# Patient Record
Sex: FEMALE | Race: WHITE | Employment: UNEMPLOYED | URBAN - METROPOLITAN AREA
[De-identification: names, ages, dates, MRNs, and addresses within clinical notes are randomized per-mention and may not be internally consistent; named-entity substitution may affect disease eponyms.]

---

## 2017-03-23 ENCOUNTER — LAB ENCOUNTER (OUTPATIENT)
Dept: LAB | Age: 37
End: 2017-03-23
Attending: FAMILY MEDICINE
Payer: COMMERCIAL

## 2017-03-23 DIAGNOSIS — Z13.29 THYROID DISORDER SCREEN: ICD-10-CM

## 2017-03-23 DIAGNOSIS — Z13.220 SCREENING CHOLESTEROL LEVEL: ICD-10-CM

## 2017-03-23 DIAGNOSIS — Z13.1 DIABETES MELLITUS SCREENING: ICD-10-CM

## 2017-03-23 LAB
ALBUMIN SERPL-MCNC: 3.8 G/DL (ref 3.5–4.8)
ALP LIVER SERPL-CCNC: 48 U/L (ref 37–98)
ALT SERPL-CCNC: 15 U/L (ref 14–54)
ANTI-THYROGLOBULIN: 148 U/ML (ref ?–60)
ANTI-THYROPEROXIDASE: <28 U/ML (ref ?–60)
AST SERPL-CCNC: 10 U/L (ref 15–41)
BASOPHILS # BLD AUTO: 0.04 X10(3) UL (ref 0–0.1)
BASOPHILS NFR BLD AUTO: 0.7 %
BILIRUB SERPL-MCNC: 0.5 MG/DL (ref 0.1–2)
BUN BLD-MCNC: 9 MG/DL (ref 8–20)
CALCIUM BLD-MCNC: 8.7 MG/DL (ref 8.3–10.3)
CHLORIDE: 104 MMOL/L (ref 101–111)
CHOLEST SMN-MCNC: 160 MG/DL (ref ?–200)
CO2: 30 MMOL/L (ref 22–32)
CREAT BLD-MCNC: 0.95 MG/DL (ref 0.55–1.02)
EOSINOPHIL # BLD AUTO: 0.22 X10(3) UL (ref 0–0.3)
EOSINOPHIL NFR BLD AUTO: 3.9 %
ERYTHROCYTE [DISTWIDTH] IN BLOOD BY AUTOMATED COUNT: 12.2 % (ref 11.5–16)
FREE T4: 0.9 NG/DL (ref 0.9–1.8)
GLUCOSE BLD-MCNC: 76 MG/DL (ref 70–99)
HCT VFR BLD AUTO: 43 % (ref 34–50)
HDLC SERPL-MCNC: 52 MG/DL (ref 45–?)
HDLC SERPL: 3.08 {RATIO} (ref ?–4.44)
HGB BLD-MCNC: 14.3 G/DL (ref 12–16)
IMMATURE GRANULOCYTE COUNT: 0.01 X10(3) UL (ref 0–1)
IMMATURE GRANULOCYTE RATIO %: 0.2 %
LDLC SERPL CALC-MCNC: 86 MG/DL (ref ?–130)
LYMPHOCYTES # BLD AUTO: 1.77 X10(3) UL (ref 0.9–4)
LYMPHOCYTES NFR BLD AUTO: 31.3 %
M PROTEIN MFR SERPL ELPH: 7.2 G/DL (ref 6.1–8.3)
MCH RBC QN AUTO: 30.8 PG (ref 27–33.2)
MCHC RBC AUTO-ENTMCNC: 33.3 G/DL (ref 31–37)
MCV RBC AUTO: 92.5 FL (ref 81–100)
MONOCYTES # BLD AUTO: 0.49 X10(3) UL (ref 0.1–0.6)
MONOCYTES NFR BLD AUTO: 8.7 %
NEUTROPHIL ABS PRELIM: 3.13 X10 (3) UL (ref 1.3–6.7)
NEUTROPHILS # BLD AUTO: 3.13 X10(3) UL (ref 1.3–6.7)
NEUTROPHILS NFR BLD AUTO: 55.2 %
NONHDLC SERPL-MCNC: 108 MG/DL (ref ?–130)
PLATELET # BLD AUTO: 238 10(3)UL (ref 150–450)
POTASSIUM SERPL-SCNC: 4 MMOL/L (ref 3.6–5.1)
RBC # BLD AUTO: 4.65 X10(6)UL (ref 3.8–5.1)
RED CELL DISTRIBUTION WIDTH-SD: 41.5 FL (ref 35.1–46.3)
SODIUM SERPL-SCNC: 140 MMOL/L (ref 136–144)
TRIGLYCERIDES: 112 MG/DL (ref ?–150)
TSI SER-ACNC: 0.46 MIU/ML (ref 0.35–5.5)
VLDL: 22 MG/DL (ref 5–40)
WBC # BLD AUTO: 5.7 X10(3) UL (ref 4–13)

## 2017-03-23 PROCEDURE — 86800 THYROGLOBULIN ANTIBODY: CPT

## 2017-03-23 PROCEDURE — 80053 COMPREHEN METABOLIC PANEL: CPT

## 2017-03-23 PROCEDURE — 85025 COMPLETE CBC W/AUTO DIFF WBC: CPT

## 2017-03-23 PROCEDURE — 84443 ASSAY THYROID STIM HORMONE: CPT

## 2017-03-23 PROCEDURE — 36415 COLL VENOUS BLD VENIPUNCTURE: CPT

## 2017-03-23 PROCEDURE — 84439 ASSAY OF FREE THYROXINE: CPT

## 2017-03-23 PROCEDURE — 80061 LIPID PANEL: CPT

## 2017-03-23 PROCEDURE — 86376 MICROSOMAL ANTIBODY EACH: CPT

## 2017-04-11 ENCOUNTER — OFFICE VISIT (OUTPATIENT)
Dept: OBGYN CLINIC | Facility: CLINIC | Age: 37
End: 2017-04-11

## 2017-04-11 VITALS
WEIGHT: 141 LBS | HEART RATE: 90 BPM | BODY MASS INDEX: 21.87 KG/M2 | HEIGHT: 67.5 IN | DIASTOLIC BLOOD PRESSURE: 62 MMHG | SYSTOLIC BLOOD PRESSURE: 108 MMHG

## 2017-04-11 DIAGNOSIS — N94.12 DEEP DYSPAREUNIA: ICD-10-CM

## 2017-04-11 DIAGNOSIS — Z30.09 ENCOUNTER FOR OTHER GENERAL COUNSELING OR ADVICE ON CONTRACEPTION: ICD-10-CM

## 2017-04-11 DIAGNOSIS — N81.4 UTERINE PROLAPSE: Primary | ICD-10-CM

## 2017-04-11 PROCEDURE — 99202 OFFICE O/P NEW SF 15 MIN: CPT | Performed by: OBSTETRICS & GYNECOLOGY

## 2017-04-11 RX ORDER — DULOXETIN HYDROCHLORIDE 30 MG/1
CAPSULE, DELAYED RELEASE ORAL 2 TIMES DAILY
Refills: 0 | COMMUNITY
Start: 2017-04-10 | End: 2018-12-07

## 2017-04-11 NOTE — PROGRESS NOTES
New gynecology visit. 31year old G 2 P 2002. Patient's last menstrual period was 03/21/2017. Joshua Cuellar Here for resumption of Depo Provera and counseling on uterine prolapse. Was on Depo Provera until fall of 2016.  Was amenorrheic on medication and no other organomegaly. No inguinal adenopathy. VULVA:  No lesions. Normal perineum and introital support. [place] VAGINA:  Healthy. Minimal rectocele. CERVIX:  Parous and posterior onto posterior vaginal wall. Pap smear not done. UTERUS:  Anteflexed and normal size.

## 2017-04-14 ENCOUNTER — TELEPHONE (OUTPATIENT)
Dept: OBGYN CLINIC | Facility: CLINIC | Age: 37
End: 2017-04-14

## 2017-04-14 NOTE — TELEPHONE ENCOUNTER
Left message for pt to call back for surgery scheduling.  6/27/17 and 7/10/17 both 7:30am available.

## 2017-04-19 ENCOUNTER — OFFICE VISIT (OUTPATIENT)
Dept: OBGYN CLINIC | Facility: CLINIC | Age: 37
End: 2017-04-19

## 2017-04-19 DIAGNOSIS — Z30.013 INITIATION OF DEPO PROVERA: Primary | ICD-10-CM

## 2017-04-19 PROCEDURE — 96372 THER/PROPH/DIAG INJ SC/IM: CPT | Performed by: OBSTETRICS & GYNECOLOGY

## 2017-04-19 PROCEDURE — 81025 URINE PREGNANCY TEST: CPT | Performed by: OBSTETRICS & GYNECOLOGY

## 2017-04-19 RX ORDER — MEDROXYPROGESTERONE ACETATE 150 MG/ML
150 INJECTION, SUSPENSION INTRAMUSCULAR ONCE
Status: COMPLETED | OUTPATIENT
Start: 2017-04-19 | End: 2017-04-19

## 2017-04-19 RX ADMIN — MEDROXYPROGESTERONE ACETATE 150 MG: 150 INJECTION, SUSPENSION INTRAMUSCULAR at 15:17:00

## 2017-04-19 NOTE — PROGRESS NOTES
Here for restart of Depo Provera for contraception until hysterectomy done in June 2017. On menses now.     LMP 03/21/2017

## 2017-05-09 ENCOUNTER — TELEPHONE (OUTPATIENT)
Dept: OBGYN CLINIC | Facility: CLINIC | Age: 37
End: 2017-05-09

## 2017-05-09 DIAGNOSIS — Z01.818 PRE-OP TESTING: Primary | ICD-10-CM

## 2017-05-09 NOTE — TELEPHONE ENCOUNTER
Rahel with THE [facility] stating Request for perop testing was denied by pcp  pcp stating she is not a pt of theirs and will not be ordering preop testing  Rahel requesting orders for cbc, etc from Dr. Cassandra Castellanos office

## 2017-05-09 NOTE — TELEPHONE ENCOUNTER
Per PAT, pt needs CBC before surgery on 6/27/17. Lab ordered. Left message on answering machine to call back.

## 2017-05-10 NOTE — TELEPHONE ENCOUNTER
Patient informed that she needs CBC done before 06/27/17. Verbalized understanding. No further questions or concerns.

## 2017-05-18 NOTE — TELEPHONE ENCOUNTER
Pt has surgery scheduled for 6/27/17. PSRs please call patient to schedule post op appt for 2 weeks after surgery date.

## 2017-05-18 NOTE — TELEPHONE ENCOUNTER
Pt is scheduled July 12th in [place] for Dr. Mika Llamas    Pt is asking if can get surgery sooner or be put on a waitlist

## 2017-05-18 NOTE — TELEPHONE ENCOUNTER
Informed patient that there is nothing sooner. If we have a cancellation we can let her know. Verbalized understanding. No further questions or concerns.

## 2017-05-27 ENCOUNTER — LAB ENCOUNTER (OUTPATIENT)
Dept: LAB | Age: 37
End: 2017-05-27
Attending: OBSTETRICS & GYNECOLOGY
Payer: COMMERCIAL

## 2017-05-27 DIAGNOSIS — Z01.818 PRE-OP TESTING: ICD-10-CM

## 2017-05-27 PROCEDURE — 36415 COLL VENOUS BLD VENIPUNCTURE: CPT

## 2017-05-27 PROCEDURE — 85025 COMPLETE CBC W/AUTO DIFF WBC: CPT

## 2017-06-21 ENCOUNTER — TELEPHONE (OUTPATIENT)
Dept: OBGYN CLINIC | Facility: CLINIC | Age: 37
End: 2017-06-21

## 2017-06-21 NOTE — TELEPHONE ENCOUNTER
Patient had questions regarding surgery. All questions answered. Verbalized understanding. No further questions or concerns.

## 2017-06-21 NOTE — TELEPHONE ENCOUNTER
Patient called, she scheduled for Hysterectomy on June 27th with Dr. Mika Llamas, she wants to know what will happen after and what to expect after her procedure. She wants to prepare herself for it.  Please call pt back

## 2017-06-26 ENCOUNTER — TELEPHONE (OUTPATIENT)
Dept: OBGYN CLINIC | Facility: CLINIC | Age: 37
End: 2017-06-26

## 2017-06-26 NOTE — TELEPHONE ENCOUNTER
Has to cancel surgery for tomorrow. No one to watch children due to family dispute. Case canceled with OR staff. Pt will call back to reschedule when she can.

## 2017-07-17 ENCOUNTER — OFFICE VISIT (OUTPATIENT)
Dept: OBGYN CLINIC | Facility: CLINIC | Age: 37
End: 2017-07-17

## 2017-07-17 DIAGNOSIS — Z30.42 ENCOUNTER FOR SURVEILLANCE OF INJECTABLE CONTRACEPTIVE: Primary | ICD-10-CM

## 2017-07-17 PROCEDURE — 96372 THER/PROPH/DIAG INJ SC/IM: CPT | Performed by: OBSTETRICS & GYNECOLOGY

## 2017-07-17 RX ORDER — MEDROXYPROGESTERONE ACETATE 150 MG/ML
150 INJECTION, SUSPENSION INTRAMUSCULAR ONCE
Status: COMPLETED | OUTPATIENT
Start: 2017-07-17 | End: 2017-07-17

## 2017-07-17 RX ADMIN — MEDROXYPROGESTERONE ACETATE 150 MG: 150 INJECTION, SUSPENSION INTRAMUSCULAR at 15:39:00

## 2017-10-16 ENCOUNTER — OFFICE VISIT (OUTPATIENT)
Dept: OBGYN CLINIC | Facility: CLINIC | Age: 37
End: 2017-10-16

## 2017-10-16 VITALS
DIASTOLIC BLOOD PRESSURE: 72 MMHG | WEIGHT: 140 LBS | SYSTOLIC BLOOD PRESSURE: 120 MMHG | HEIGHT: 67 IN | BODY MASS INDEX: 21.97 KG/M2

## 2017-10-16 DIAGNOSIS — Z30.42 ENCOUNTER FOR SURVEILLANCE OF INJECTABLE CONTRACEPTIVE: Primary | ICD-10-CM

## 2017-10-16 PROCEDURE — 96372 THER/PROPH/DIAG INJ SC/IM: CPT | Performed by: OBSTETRICS & GYNECOLOGY

## 2017-10-16 RX ORDER — MEDROXYPROGESTERONE ACETATE 150 MG/ML
150 INJECTION, SUSPENSION INTRAMUSCULAR ONCE
Status: COMPLETED | OUTPATIENT
Start: 2017-10-16 | End: 2017-10-16

## 2017-10-16 RX ADMIN — MEDROXYPROGESTERONE ACETATE 150 MG: 150 INJECTION, SUSPENSION INTRAMUSCULAR at 11:16:00

## 2017-10-16 NOTE — PROGRESS NOTES
Patient is requesting a refill on Prozac. Spoke with her while in office.  She states that she feels like her Prozac may not be working well (she also reports that she has not taken any medication for the past 3 days because she is out) and that she is due

## 2018-01-09 ENCOUNTER — OFFICE VISIT (OUTPATIENT)
Dept: OBGYN CLINIC | Facility: CLINIC | Age: 38
End: 2018-01-09

## 2018-01-09 VITALS
WEIGHT: 137 LBS | SYSTOLIC BLOOD PRESSURE: 120 MMHG | HEIGHT: 67 IN | DIASTOLIC BLOOD PRESSURE: 70 MMHG | BODY MASS INDEX: 21.5 KG/M2

## 2018-01-09 DIAGNOSIS — Z30.42 ENCOUNTER FOR SURVEILLANCE OF INJECTABLE CONTRACEPTIVE: Primary | ICD-10-CM

## 2018-01-09 PROCEDURE — 96372 THER/PROPH/DIAG INJ SC/IM: CPT | Performed by: OBSTETRICS & GYNECOLOGY

## 2018-01-09 RX ORDER — MEDROXYPROGESTERONE ACETATE 150 MG/ML
150 INJECTION, SUSPENSION INTRAMUSCULAR ONCE
Status: COMPLETED | OUTPATIENT
Start: 2018-01-09 | End: 2018-01-09

## 2018-01-09 RX ADMIN — MEDROXYPROGESTERONE ACETATE 150 MG: 150 INJECTION, SUSPENSION INTRAMUSCULAR at 11:35:00

## 2018-04-09 ENCOUNTER — OFFICE VISIT (OUTPATIENT)
Dept: OBGYN CLINIC | Facility: CLINIC | Age: 38
End: 2018-04-09

## 2018-04-09 DIAGNOSIS — Z30.42 ENCOUNTER FOR DEPO-PROVERA CONTRACEPTION: Primary | ICD-10-CM

## 2018-04-09 PROCEDURE — 96372 THER/PROPH/DIAG INJ SC/IM: CPT | Performed by: OBSTETRICS & GYNECOLOGY

## 2018-04-09 RX ORDER — MEDROXYPROGESTERONE ACETATE 150 MG/ML
150 INJECTION, SUSPENSION INTRAMUSCULAR ONCE
Status: COMPLETED | OUTPATIENT
Start: 2018-04-09 | End: 2018-04-09

## 2018-04-09 RX ADMIN — MEDROXYPROGESTERONE ACETATE 150 MG: 150 INJECTION, SUSPENSION INTRAMUSCULAR at 11:56:00

## 2018-04-09 NOTE — PROGRESS NOTES
Originally scheduled for annual and repeat DepoProvera but cannot stay for annual  DepoProvera ordered.  See in 3 months for repeat injection and annual.  Or can come back at other date for annual.

## 2018-05-04 ENCOUNTER — LAB ENCOUNTER (OUTPATIENT)
Dept: LAB | Age: 38
End: 2018-05-04
Attending: DERMATOLOGY
Payer: COMMERCIAL

## 2018-05-04 DIAGNOSIS — D48.5 NEOPLASM OF UNCERTAIN BEHAVIOR OF SKIN: ICD-10-CM

## 2018-05-04 DIAGNOSIS — C44.310 BCC (BASAL CELL CARCINOMA), FACE: ICD-10-CM

## 2018-05-04 PROCEDURE — 88305 TISSUE EXAM BY PATHOLOGIST: CPT

## 2018-07-10 ENCOUNTER — TELEPHONE (OUTPATIENT)
Dept: OBGYN CLINIC | Facility: CLINIC | Age: 38
End: 2018-07-10

## 2018-07-10 DIAGNOSIS — N91.2 AMENORRHEA: Primary | ICD-10-CM

## 2018-07-10 NOTE — TELEPHONE ENCOUNTER
PT missed depo shot time frame and has questions regarding when her next pap is to be scheduled. PT has questions regarding pap as well.

## 2018-07-12 NOTE — TELEPHONE ENCOUNTER
Patient notified that she is due for an annual  Annual scheduled  HCG ordered since patient missed her next injection. She knows to go to the lab the day before her appointment.

## 2018-07-18 ENCOUNTER — LAB ENCOUNTER (OUTPATIENT)
Dept: LAB | Age: 38
End: 2018-07-18
Attending: NURSE PRACTITIONER
Payer: COMMERCIAL

## 2018-07-18 DIAGNOSIS — N91.2 AMENORRHEA: ICD-10-CM

## 2018-07-18 LAB — HCG QUANTITATIVE: <1 MIU/ML (ref ?–3)

## 2018-07-18 PROCEDURE — 36415 COLL VENOUS BLD VENIPUNCTURE: CPT

## 2018-07-18 PROCEDURE — 84703 CHORIONIC GONADOTROPIN ASSAY: CPT

## 2018-07-19 ENCOUNTER — OFFICE VISIT (OUTPATIENT)
Dept: OBGYN CLINIC | Facility: CLINIC | Age: 38
End: 2018-07-19

## 2018-07-19 VITALS
WEIGHT: 139 LBS | HEIGHT: 67 IN | DIASTOLIC BLOOD PRESSURE: 58 MMHG | BODY MASS INDEX: 21.82 KG/M2 | SYSTOLIC BLOOD PRESSURE: 116 MMHG

## 2018-07-19 DIAGNOSIS — N81.4 UTERINE PROLAPSE: ICD-10-CM

## 2018-07-19 DIAGNOSIS — Z30.49 ENCOUNTER FOR SURVEILLANCE OF OTHER CONTRACEPTIVE: Primary | ICD-10-CM

## 2018-07-19 DIAGNOSIS — Z12.4 CERVICAL CANCER SCREENING: ICD-10-CM

## 2018-07-19 DIAGNOSIS — Z01.419 WELL WOMAN EXAM WITH ROUTINE GYNECOLOGICAL EXAM: ICD-10-CM

## 2018-07-19 PROCEDURE — 88175 CYTOPATH C/V AUTO FLUID REDO: CPT | Performed by: NURSE PRACTITIONER

## 2018-07-19 PROCEDURE — 96372 THER/PROPH/DIAG INJ SC/IM: CPT | Performed by: NURSE PRACTITIONER

## 2018-07-19 PROCEDURE — 99395 PREV VISIT EST AGE 18-39: CPT | Performed by: NURSE PRACTITIONER

## 2018-07-19 RX ORDER — FLUOXETINE 20 MG/1
20 TABLET, FILM COATED ORAL 4 TIMES DAILY
COMMUNITY
End: 2018-12-07

## 2018-07-19 RX ORDER — MEDROXYPROGESTERONE ACETATE 150 MG/ML
150 INJECTION, SUSPENSION INTRAMUSCULAR ONCE
Status: COMPLETED | OUTPATIENT
Start: 2018-07-19 | End: 2018-07-19

## 2018-07-19 RX ADMIN — MEDROXYPROGESTERONE ACETATE 150 MG: 150 INJECTION, SUSPENSION INTRAMUSCULAR at 15:04:00

## 2018-07-19 NOTE — PROGRESS NOTES
Here for Routine Annual Exam  Still desires hysterectomy, her uterus gets sore when she is due for her Depo. No menses on Depo. No C/O, dad is on hospice with pancreatic cancer    ROS: No Cardiac, Respiratory, GI,  or Neurological symptoms.     PE:

## 2018-09-15 ENCOUNTER — HOSPITAL ENCOUNTER (EMERGENCY)
Age: 38
Discharge: HOME | End: 2018-09-15
Attending: EMERGENCY MEDICINE
Payer: COMMERCIAL

## 2018-09-15 ENCOUNTER — TELEPHONE (OUTPATIENT)
Dept: OBGYN CLINIC | Facility: CLINIC | Age: 38
End: 2018-09-15

## 2018-09-15 ENCOUNTER — APPOINTMENT (OUTPATIENT)
Dept: ULTRASOUND IMAGING | Age: 38
End: 2018-09-15
Attending: EMERGENCY MEDICINE
Payer: COMMERCIAL

## 2018-09-15 VITALS
HEART RATE: 65 BPM | BODY MASS INDEX: 20.4 KG/M2 | DIASTOLIC BLOOD PRESSURE: 70 MMHG | OXYGEN SATURATION: 100 % | RESPIRATION RATE: 14 BRPM | WEIGHT: 130 LBS | TEMPERATURE: 99 F | SYSTOLIC BLOOD PRESSURE: 115 MMHG | HEIGHT: 67 IN

## 2018-09-15 DIAGNOSIS — O03.4 INEVITABLE SPONTANEOUS ABORTION: Primary | ICD-10-CM

## 2018-09-15 DIAGNOSIS — L03.115 CELLULITIS OF RIGHT ANTERIOR LOWER LEG: ICD-10-CM

## 2018-09-15 LAB
B-HCG SERPL-ACNC: 378 MIU/ML
BASOPHILS # BLD AUTO: 0.03 X10(3) UL (ref 0–0.1)
BASOPHILS NFR BLD AUTO: 0.4 %
EOSINOPHIL # BLD AUTO: 0.23 X10(3) UL (ref 0–0.3)
EOSINOPHIL NFR BLD AUTO: 2.7 %
ERYTHROCYTE [DISTWIDTH] IN BLOOD BY AUTOMATED COUNT: 11.6 % (ref 11.5–16)
HCT VFR BLD AUTO: 38 % (ref 34–50)
HGB BLD-MCNC: 12.3 G/DL (ref 12–16)
IMMATURE GRANULOCYTE COUNT: 0.03 X10(3) UL (ref 0–1)
IMMATURE GRANULOCYTE RATIO %: 0.4 %
LYMPHOCYTES # BLD AUTO: 1.78 X10(3) UL (ref 0.9–4)
LYMPHOCYTES NFR BLD AUTO: 20.9 %
MCH RBC QN AUTO: 30.1 PG (ref 27–33.2)
MCHC RBC AUTO-ENTMCNC: 32.4 G/DL (ref 31–37)
MCV RBC AUTO: 93.1 FL (ref 81–100)
MONOCYTES # BLD AUTO: 0.65 X10(3) UL (ref 0.1–1)
MONOCYTES NFR BLD AUTO: 7.6 %
NEUTROPHIL ABS PRELIM: 5.78 X10 (3) UL (ref 1.3–6.7)
NEUTROPHILS # BLD AUTO: 5.78 X10(3) UL (ref 1.3–6.7)
NEUTROPHILS NFR BLD AUTO: 68 %
PLATELET # BLD AUTO: 227 10(3)UL (ref 150–450)
POCT LOT NUMBER: NORMAL
POCT URINE PREGNANCY: NEGATIVE
RBC # BLD AUTO: 4.08 X10(6)UL (ref 3.8–5.1)
RED CELL DISTRIBUTION WIDTH-SD: 39.4 FL (ref 35.1–46.3)
RH BLOOD TYPE: POSITIVE
WBC # BLD AUTO: 8.5 X10(3) UL (ref 4–13)

## 2018-09-15 PROCEDURE — 86900 BLOOD TYPING SEROLOGIC ABO: CPT | Performed by: EMERGENCY MEDICINE

## 2018-09-15 PROCEDURE — 36415 COLL VENOUS BLD VENIPUNCTURE: CPT

## 2018-09-15 PROCEDURE — 99284 EMERGENCY DEPT VISIT MOD MDM: CPT

## 2018-09-15 PROCEDURE — 76817 TRANSVAGINAL US OBSTETRIC: CPT | Performed by: EMERGENCY MEDICINE

## 2018-09-15 PROCEDURE — 76801 OB US < 14 WKS SINGLE FETUS: CPT | Performed by: EMERGENCY MEDICINE

## 2018-09-15 PROCEDURE — 84702 CHORIONIC GONADOTROPIN TEST: CPT | Performed by: EMERGENCY MEDICINE

## 2018-09-15 PROCEDURE — 85025 COMPLETE CBC W/AUTO DIFF WBC: CPT | Performed by: EMERGENCY MEDICINE

## 2018-09-15 PROCEDURE — 86901 BLOOD TYPING SEROLOGIC RH(D): CPT | Performed by: EMERGENCY MEDICINE

## 2018-09-15 RX ORDER — CEPHALEXIN 500 MG/1
500 CAPSULE ORAL 4 TIMES DAILY
Qty: 28 CAPSULE | Refills: 0 | Status: SHIPPED | OUTPATIENT
Start: 2018-09-15 | End: 2018-09-22

## 2018-09-15 RX ORDER — NAPROXEN 500 MG/1
500 TABLET ORAL 2 TIMES DAILY PRN
Qty: 20 TABLET | Refills: 0 | Status: SHIPPED | OUTPATIENT
Start: 2018-09-15 | End: 2018-09-22

## 2018-09-15 NOTE — ED PROVIDER NOTES
Patient Seen in: THE [facility] Emergency Department In [place]    History   Patient presents with:  Eval-G (gynecologic)  Lower Extremity Injury (musculoskeletal)    Stated Complaint: vaginal bleeding from miscarriage today, also right knee swelling and fill 125/73   Pulse 62   Resp 14   Temp 98.7 °F (37.1 °C)   Temp src Oral   SpO2 98 %   O2 Device None (Room air)       Current:/70   Pulse 65   Temp 98.7 °F (37.1 °C) (Oral)   Resp 14   Ht 170.2 cm (5' 7\")   Wt 59 kg   LMP 07/02/2018 (Approximate)   SpO Status                     ---------                               -----------         ------                     CBC W/ DIFFERENTIAL[[id number]]                              Final result                 Please view results for these tests on the individu follow-up on Monday or Tuesday for repeat quant. She will also be treated with oral cephalexin for the cellulitis below her knee.     Disposition and Plan     Clinical Impression:  Inevitable spontaneous   (primary encounter diagnosis)  Cellulitis

## 2018-09-15 NOTE — ED INITIAL ASSESSMENT (HPI)
Patient states she did not know she was pregnant and started spotting 2 days ago  Had cramping last night and heavy bleeding today with tissue and clots

## 2018-09-15 NOTE — TELEPHONE ENCOUNTER
Pt states she she has very bad painful cramping and bleeding (this is outside of her normal cycle)  Pt states when she went to bathroom she passed tissue   Pt said it was not a clot it was definitely tissue   Pt states she did not know she was pregnant but

## 2018-09-16 NOTE — TELEPHONE ENCOUNTER
Called pt. She went to ED due to bleeding. Diagnosed with possible miscarriage  Measuring 6w4d on US but no live intrauterine gestation per report. [facility]  Advised to call office on Monday and get a sooner appt if needed.  She knows to call answering se

## 2018-09-17 ENCOUNTER — TELEPHONE (OUTPATIENT)
Dept: OBGYN CLINIC | Facility: CLINIC | Age: 38
End: 2018-09-17

## 2018-09-17 DIAGNOSIS — O03.9 SPONTANEOUS MISCARRIAGE: Primary | ICD-10-CM

## 2018-09-17 NOTE — TELEPHONE ENCOUNTER
Ultrasound in ER without evidence of pregnancy and low HCG value. Rh positive. Will order repeat HCG since has been 48 hours since last value. Have pt take ibuprofen 600 mg Q 6 hours. Follow up pending lab results.

## 2018-09-17 NOTE — TELEPHONE ENCOUNTER
Pt was seen in the ER on 9/15  Pt was told to follow up  Monday  Pt not sure exactly what she needs to be seen for   Pt was not sure if it was a D&C

## 2018-09-17 NOTE — TELEPHONE ENCOUNTER
Patient was seen in ER on 09/15/18. She was advised that she was having miscarriage. US shows 6 weeks +/- 4 days. Her HCG was 378. She states she is no longer having cramping. She is still w/ heavy bleeding, some clotting.    Patient only wants to see D

## 2018-09-18 ENCOUNTER — LAB ENCOUNTER (OUTPATIENT)
Dept: LAB | Age: 38
End: 2018-09-18
Attending: OBSTETRICS & GYNECOLOGY
Payer: COMMERCIAL

## 2018-09-18 DIAGNOSIS — O03.9 SPONTANEOUS MISCARRIAGE: ICD-10-CM

## 2018-09-18 LAB — B-HCG SERPL-ACNC: 225 MIU/ML

## 2018-09-18 PROCEDURE — 36415 COLL VENOUS BLD VENIPUNCTURE: CPT

## 2018-09-18 PROCEDURE — 84702 CHORIONIC GONADOTROPIN TEST: CPT

## 2018-09-19 DIAGNOSIS — O03.9 MISCARRIAGE: Primary | ICD-10-CM

## 2018-09-19 NOTE — PROGRESS NOTES
Decrease in HCG consistent with evolving loss, but less than expected. Have pt repeat lab at end of week.

## 2018-09-20 ENCOUNTER — TELEPHONE (OUTPATIENT)
Dept: OBGYN CLINIC | Facility: CLINIC | Age: 38
End: 2018-09-20

## 2018-09-20 NOTE — TELEPHONE ENCOUNTER
Patient informed of result notes/recommendations. Patient states she has had some bleeding. Yesterday she had a lot of watery discharge. Today, minimal spotting. No severe abdominal pain or cramping. Patient's HCG was 225 on 09/18/18.   Advised her to repe

## 2018-09-21 ENCOUNTER — LAB ENCOUNTER (OUTPATIENT)
Dept: LAB | Age: 38
End: 2018-09-21
Attending: OBSTETRICS & GYNECOLOGY
Payer: COMMERCIAL

## 2018-09-21 DIAGNOSIS — O03.9 MISCARRIAGE: ICD-10-CM

## 2018-09-21 LAB — B-HCG SERPL-ACNC: 197 MIU/ML

## 2018-09-21 PROCEDURE — 84702 CHORIONIC GONADOTROPIN TEST: CPT

## 2018-09-21 PROCEDURE — 36415 COLL VENOUS BLD VENIPUNCTURE: CPT

## 2018-09-24 ENCOUNTER — TELEPHONE (OUTPATIENT)
Dept: OBGYN CLINIC | Facility: CLINIC | Age: 38
End: 2018-09-24

## 2018-09-24 NOTE — TELEPHONE ENCOUNTER
Per result note from 9/21/18 Hcg, pt to f/u in office this week and have repeat Hcg. Pt scheduled for appt tomorrow. Pt reports cramping and spotting. Bleeding precautions reviewed with patient. Patient verbalized understanding.

## 2018-09-24 NOTE — TELEPHONE ENCOUNTER
PT is calling because she had recent Miscarriage   Pt states today she is cramping and has a lot of pain with urination she is in a lot of discomfort   Pt not sure if she has UTI also but not sure what she should do

## 2018-09-25 ENCOUNTER — LAB ENCOUNTER (OUTPATIENT)
Dept: LAB | Age: 38
End: 2018-09-25
Attending: OBSTETRICS & GYNECOLOGY
Payer: COMMERCIAL

## 2018-09-25 ENCOUNTER — OFFICE VISIT (OUTPATIENT)
Dept: OBGYN CLINIC | Facility: CLINIC | Age: 38
End: 2018-09-25

## 2018-09-25 ENCOUNTER — TELEPHONE (OUTPATIENT)
Dept: OBGYN CLINIC | Facility: CLINIC | Age: 38
End: 2018-09-25

## 2018-09-25 VITALS
DIASTOLIC BLOOD PRESSURE: 70 MMHG | RESPIRATION RATE: 16 BRPM | BODY MASS INDEX: 22.76 KG/M2 | WEIGHT: 145 LBS | HEIGHT: 67 IN | HEART RATE: 87 BPM | SYSTOLIC BLOOD PRESSURE: 118 MMHG

## 2018-09-25 DIAGNOSIS — R10.2 PELVIC CRAMPING: ICD-10-CM

## 2018-09-25 DIAGNOSIS — O03.9 SPONTANEOUS MISCARRIAGE: ICD-10-CM

## 2018-09-25 DIAGNOSIS — O03.9 SPONTANEOUS MISCARRIAGE: Primary | ICD-10-CM

## 2018-09-25 LAB
B-HCG SERPL-ACNC: 78 MIU/ML
BASOPHILS # BLD AUTO: 0.03 X10(3) UL (ref 0–0.1)
BASOPHILS NFR BLD AUTO: 0.3 %
EOSINOPHIL # BLD AUTO: 0.14 X10(3) UL (ref 0–0.3)
EOSINOPHIL NFR BLD AUTO: 1.5 %
ERYTHROCYTE [DISTWIDTH] IN BLOOD BY AUTOMATED COUNT: 11.4 % (ref 11.5–16)
HCT VFR BLD AUTO: 38.4 % (ref 34–50)
HGB BLD-MCNC: 12.6 G/DL (ref 12–16)
IMMATURE GRANULOCYTE COUNT: 0.05 X10(3) UL (ref 0–1)
IMMATURE GRANULOCYTE RATIO %: 0.5 %
LYMPHOCYTES # BLD AUTO: 1.24 X10(3) UL (ref 0.9–4)
LYMPHOCYTES NFR BLD AUTO: 13 %
MCH RBC QN AUTO: 30.1 PG (ref 27–33.2)
MCHC RBC AUTO-ENTMCNC: 32.8 G/DL (ref 31–37)
MCV RBC AUTO: 91.9 FL (ref 81–100)
MONOCYTES # BLD AUTO: 0.61 X10(3) UL (ref 0.1–1)
MONOCYTES NFR BLD AUTO: 6.4 %
NEUTROPHIL ABS PRELIM: 7.48 X10 (3) UL (ref 1.3–6.7)
NEUTROPHILS # BLD AUTO: 7.48 X10(3) UL (ref 1.3–6.7)
NEUTROPHILS NFR BLD AUTO: 78.3 %
PLATELET # BLD AUTO: 280 10(3)UL (ref 150–450)
RBC # BLD AUTO: 4.18 X10(6)UL (ref 3.8–5.1)
RED CELL DISTRIBUTION WIDTH-SD: 38.3 FL (ref 35.1–46.3)
WBC # BLD AUTO: 9.6 X10(3) UL (ref 4–13)

## 2018-09-25 PROCEDURE — 99213 OFFICE O/P EST LOW 20 MIN: CPT | Performed by: OBSTETRICS & GYNECOLOGY

## 2018-09-25 PROCEDURE — 85025 COMPLETE CBC W/AUTO DIFF WBC: CPT

## 2018-09-25 PROCEDURE — 36415 COLL VENOUS BLD VENIPUNCTURE: CPT

## 2018-09-25 PROCEDURE — 84702 CHORIONIC GONADOTROPIN TEST: CPT

## 2018-09-25 NOTE — PROGRESS NOTES
Patient is a 41year old here for follow up of evolving miscarriage. Seen in July 2018 for annual exam and Depo Provera injection. Was one week late for injection and had negative HCG. Returned for Depo injection the next day.  Began bleeding heavier than m

## 2018-09-28 ENCOUNTER — TELEPHONE (OUTPATIENT)
Dept: OBGYN CLINIC | Facility: CLINIC | Age: 38
End: 2018-09-28

## 2018-09-28 NOTE — TELEPHONE ENCOUNTER
Patient informed of hcg/cbc results. She has US scheduled for Monday. No further questions or concerns.

## 2018-10-01 ENCOUNTER — ULTRASOUND ENCOUNTER (OUTPATIENT)
Dept: OBGYN CLINIC | Facility: CLINIC | Age: 38
End: 2018-10-01

## 2018-10-01 DIAGNOSIS — O03.9 SAB (SPONTANEOUS ABORTION): Primary | ICD-10-CM

## 2018-10-01 PROCEDURE — 76817 TRANSVAGINAL US OBSTETRIC: CPT | Performed by: OBSTETRICS & GYNECOLOGY

## 2018-10-10 ENCOUNTER — TELEPHONE (OUTPATIENT)
Dept: OBGYN CLINIC | Facility: CLINIC | Age: 38
End: 2018-10-10

## 2018-10-10 DIAGNOSIS — N93.8 DUB (DYSFUNCTIONAL UTERINE BLEEDING): Primary | ICD-10-CM

## 2018-10-11 NOTE — TELEPHONE ENCOUNTER
Ultrasound results discussed. Still with intermittent bleeding and did increase after ultrasound. No menses yet since miscarriage. Last HCG on 9/25 78. Will repeat HCG level and then consider progestin induction of menses.

## 2018-10-15 ENCOUNTER — LAB ENCOUNTER (OUTPATIENT)
Dept: LAB | Age: 38
End: 2018-10-15
Attending: OBSTETRICS & GYNECOLOGY
Payer: COMMERCIAL

## 2018-10-15 DIAGNOSIS — N93.8 DUB (DYSFUNCTIONAL UTERINE BLEEDING): ICD-10-CM

## 2018-10-15 PROCEDURE — 84702 CHORIONIC GONADOTROPIN TEST: CPT

## 2018-10-15 PROCEDURE — 36415 COLL VENOUS BLD VENIPUNCTURE: CPT

## 2018-10-16 ENCOUNTER — TELEPHONE (OUTPATIENT)
Dept: OBGYN CLINIC | Facility: CLINIC | Age: 38
End: 2018-10-16

## 2018-10-16 NOTE — TELEPHONE ENCOUNTER
HCG level of 3 discussed. No menses yet. Still with some intermittent bleeding. Options discussed - await spontaneous menses vs progestin induction of menses. Wishes intervention - Prometrium 200 mg QHS for 10 days. No call on withdrawal bleeding.

## 2018-12-03 ENCOUNTER — TELEPHONE (OUTPATIENT)
Dept: OBGYN CLINIC | Facility: CLINIC | Age: 38
End: 2018-12-03

## 2018-12-03 DIAGNOSIS — Z30.42 DEPO-PROVERA CONTRACEPTIVE STATUS: Primary | ICD-10-CM

## 2018-12-03 NOTE — TELEPHONE ENCOUNTER
40 y/o called regarding depo injection. She had a recent miscarriage in October 2018. Her most recent HCG was 6 on 10/15/18. She was given Prometrium on 10/17/18 to induce period but she never took it and got her menses on 10/18/18. It lasted 10 days.  She

## 2018-12-03 NOTE — TELEPHONE ENCOUNTER
Repeat HCG. Condoms or abstinence for contraception. Can wait until menses or get injection once HCG results reviewed.

## 2018-12-03 NOTE — TELEPHONE ENCOUNTER
Pt called to schedule Depo inj. Says she is behind on injections.  Last inj was in July    I scheduled her for this Friday   Future Appointments   Date Time Provider Angelika Pereyra   12/7/2018 10:15 Marialuisa Becerril MD [facility] [address]     Please

## 2018-12-06 ENCOUNTER — LAB ENCOUNTER (OUTPATIENT)
Dept: LAB | Age: 38
End: 2018-12-06
Attending: OBSTETRICS & GYNECOLOGY
Payer: COMMERCIAL

## 2018-12-06 DIAGNOSIS — Z30.42 DEPO-PROVERA CONTRACEPTIVE STATUS: ICD-10-CM

## 2018-12-06 PROCEDURE — 84702 CHORIONIC GONADOTROPIN TEST: CPT

## 2018-12-06 PROCEDURE — 36415 COLL VENOUS BLD VENIPUNCTURE: CPT

## 2018-12-07 ENCOUNTER — OFFICE VISIT (OUTPATIENT)
Dept: OBGYN CLINIC | Facility: CLINIC | Age: 38
End: 2018-12-07

## 2018-12-07 VITALS
WEIGHT: 155.19 LBS | DIASTOLIC BLOOD PRESSURE: 80 MMHG | SYSTOLIC BLOOD PRESSURE: 110 MMHG | HEIGHT: 67 IN | BODY MASS INDEX: 24.36 KG/M2

## 2018-12-07 DIAGNOSIS — Z30.42 ON DEPO MEDROXYPROGESTERONE ACETATE FOR CONTRACEPTION: Primary | ICD-10-CM

## 2018-12-07 PROCEDURE — 96372 THER/PROPH/DIAG INJ SC/IM: CPT | Performed by: OBSTETRICS & GYNECOLOGY

## 2018-12-07 RX ORDER — MEDROXYPROGESTERONE ACETATE 150 MG/ML
150 INJECTION, SUSPENSION INTRAMUSCULAR ONCE
Status: COMPLETED | OUTPATIENT
Start: 2018-12-07 | End: 2018-12-07

## 2018-12-07 RX ORDER — DEXTROAMPHETAMINE SACCHARATE, AMPHETAMINE ASPARTATE, DEXTROAMPHETAMINE SULFATE AND AMPHETAMINE SULFATE 5; 5; 5; 5 MG/1; MG/1; MG/1; MG/1
TABLET ORAL
Refills: 0 | COMMUNITY
Start: 2018-10-11

## 2018-12-07 RX ORDER — ALPRAZOLAM 0.25 MG/1
TABLET ORAL 3 TIMES DAILY PRN
Refills: 0 | COMMUNITY
Start: 2018-07-26

## 2018-12-07 RX ADMIN — MEDROXYPROGESTERONE ACETATE 150 MG: 150 INJECTION, SUSPENSION INTRAMUSCULAR at 11:05:00

## 2018-12-12 ENCOUNTER — OFFICE VISIT (OUTPATIENT)
Dept: FAMILY MEDICINE CLINIC | Facility: CLINIC | Age: 38
End: 2018-12-12

## 2018-12-12 VITALS
OXYGEN SATURATION: 98 % | WEIGHT: 156 LBS | HEART RATE: 82 BPM | SYSTOLIC BLOOD PRESSURE: 126 MMHG | RESPIRATION RATE: 20 BRPM | HEIGHT: 67 IN | BODY MASS INDEX: 24.48 KG/M2 | DIASTOLIC BLOOD PRESSURE: 80 MMHG | TEMPERATURE: 98 F

## 2018-12-12 DIAGNOSIS — H61.21 IMPACTED CERUMEN OF RIGHT EAR: Primary | ICD-10-CM

## 2018-12-12 PROCEDURE — 99202 OFFICE O/P NEW SF 15 MIN: CPT | Performed by: NURSE PRACTITIONER

## 2018-12-12 NOTE — PATIENT INSTRUCTIONS
Impacted Earwax     Inner ear structures including ear canal and eardrum. Impacted earwax is a buildup of the natural wax in the ear (cerumen). Impacted earwax is very common. It can cause symptoms such as hearing loss.  It can also make it difficult Excess earwax usually does not cause any symptoms, unless there is a large amount of buildup.  Then it may cause symptoms such as:  · Hearing loss  · Earache  · Sense of ear fullness  · Itching in the ear  · Odor from the ear  · Ear drainage  · Dizziness  · © 1620-0176 [employer] [address] [facility] [place], [facility] [place]. All rights reserved. This information is not intended as a substitute for professional medical care. Always follow your healthcare professional's instructions.

## 2018-12-12 NOTE — PROGRESS NOTES
CHIEF COMPLAINT:   Patient presents with:  Ear Problem: right ear pain, unable to hear out of the right ear X 1-2 days       HPI:   Tushar Rodriguez is a 41year old female who presents to clinic today with complaints of right ear pain.  Has had for HEAD: atraumatic, normocephalic  EYES: conjunctiva clear, EOM intact  EARS: Bilateral tragus non tender with manipulation. Right external auditory canal obstructed with cerumen. Left external auditory canal clear.  Right TM visualized after cerumen removal Tiny glands in your ear make substances that combine with dead skin cells to form earwax. Earwax helps protect your ear canal from water, dirt, infection, and injury.  Over time, earwax travels from the inner part of your ear canal to the entrance of the ca If you don’t have symptoms, you may not need treatment. Often, the earwax goes away on its own with time. If you have symptoms, you may have one or more treatments such as:  · Eardrops to soften the earwax. This helps it leave the ear over time.   · Rinsing

## 2019-03-15 ENCOUNTER — TELEPHONE (OUTPATIENT)
Dept: FAMILY MEDICINE CLINIC | Facility: CLINIC | Age: 39
End: 2019-03-15

## 2020-10-20 ENCOUNTER — HOSPITAL ENCOUNTER (EMERGENCY)
Age: 40
Discharge: HOME | End: 2020-10-20
Attending: EMERGENCY MEDICINE
Payer: MEDICAID

## 2020-10-20 ENCOUNTER — APPOINTMENT (OUTPATIENT)
Dept: GENERAL RADIOLOGY | Age: 40
End: 2020-10-20
Attending: NURSE PRACTITIONER
Payer: MEDICAID

## 2020-10-20 VITALS
RESPIRATION RATE: 16 BRPM | HEART RATE: 79 BPM | WEIGHT: 130 LBS | OXYGEN SATURATION: 100 % | TEMPERATURE: 98 F | DIASTOLIC BLOOD PRESSURE: 91 MMHG | SYSTOLIC BLOOD PRESSURE: 136 MMHG | HEIGHT: 68 IN | BODY MASS INDEX: 19.7 KG/M2

## 2020-10-20 DIAGNOSIS — S89.92XA INJURY OF LEFT KNEE, INITIAL ENCOUNTER: Primary | ICD-10-CM

## 2020-10-20 PROCEDURE — 99283 EMERGENCY DEPT VISIT LOW MDM: CPT

## 2020-10-20 PROCEDURE — 73560 X-RAY EXAM OF KNEE 1 OR 2: CPT | Performed by: NURSE PRACTITIONER

## 2020-10-20 NOTE — ED PROVIDER NOTES
Patient Seen in: THE [facility] Emergency Department In [place]      History   Patient presents with:  Leg or Foot Injury    Stated Complaint: l knee pain    45-year-old female presents today with injury to her left knee.   States has a history of what she brandon °F (36.7 °C) (Temporal)   Resp 16   Ht 172.7 cm (5' 8\")   Wt 59 kg   SpO2 100%   BMI 19.77 kg/m²         Physical Exam  Vitals signs and nursing note reviewed. Constitutional:       Appearance: Normal appearance. HENT:      Head: Normocephalic.       Idalia Martinez instructions given. Patient encouraged take over-the-counter ibuprofen Tylenol for pain and swelling. To follow primary MD or orthopedics in the next 5 to 7 days if symptoms not improved. Patient was given instructions on her knee immobilizer.   Patient

## 2020-10-20 NOTE — ED INITIAL ASSESSMENT (HPI)
Left knee injury after giving daughter a hug, \"I lifted her and it gave out\", swelling, no pain, just \"discomfort, pt requests an MRI of knee

## 2020-12-08 ENCOUNTER — LAB ENCOUNTER (OUTPATIENT)
Dept: LAB | Age: 40
End: 2020-12-08
Attending: ORTHOPAEDIC SURGERY
Payer: MEDICAID

## 2020-12-08 DIAGNOSIS — S83.519A RUPTURE OF ANTERIOR CRUCIATE LIGAMENT: ICD-10-CM

## (undated) NOTE — MR AVS SNAPSHOT
[facility] [address], [facility] [id number] [facility]  [phone]               Thank you for choosing us for your health care visit with Valery Leger MD.  We are glad to serve you and happy to provide you with t If you have questions, you can call [phone] to talk to our [facility] Staff. Remember, MyChart is NOT to be used for urgent needs. For medical emergencies, dial 911.            Visit [facility] online at  [email or web address]

## (undated) NOTE — MR AVS SNAPSHOT
[facility] [address], [facility] [phone] [facility]  [phone]               Thank you for choosing us for your health care visit with Marie Becker MD.  We are glad to serve you and happy to provide you with t medical emergencies, dial 911.            Visit [facility] online at  [email or web address]

## (undated) NOTE — ED AVS SNAPSHOT
Tushar Rodriguez   MRN: [id number]    Department:  [facility] Emergency Department in [place]   Date of Visit:  9/15/2018           Disclosure     Insurance plans vary and the physician(s) referred by the ER may not be covered by your plan.  Please tell this physician (or your personal doctor if your instructions are to return to your personal doctor) about any new or lasting problems. The primary care or specialist physician will see patients referred from the [facility] Emergency Department.  Yesy Jimenez